# Patient Record
Sex: FEMALE | Race: BLACK OR AFRICAN AMERICAN | NOT HISPANIC OR LATINO | Employment: UNEMPLOYED | URBAN - METROPOLITAN AREA
[De-identification: names, ages, dates, MRNs, and addresses within clinical notes are randomized per-mention and may not be internally consistent; named-entity substitution may affect disease eponyms.]

---

## 2022-05-18 ENCOUNTER — HOSPITAL ENCOUNTER (EMERGENCY)
Facility: HOSPITAL | Age: 22
Discharge: LEFT BEFORE DISCHARGE | End: 2022-05-19
Payer: MEDICAID

## 2022-05-18 VITALS
TEMPERATURE: 99 F | OXYGEN SATURATION: 98 % | HEART RATE: 84 BPM | SYSTOLIC BLOOD PRESSURE: 138 MMHG | DIASTOLIC BLOOD PRESSURE: 78 MMHG | RESPIRATION RATE: 18 BRPM

## 2022-05-18 LAB
MONO NEG CONTROL (OHS): NEGATIVE
MONO POS CONTROL (OHS): POSITIVE
MONO SCR (OHS): NEGATIVE
STREP A PCR (OHS): NOT DETECTED

## 2022-05-18 PROCEDURE — 36415 COLL VENOUS BLD VENIPUNCTURE: CPT | Performed by: NURSE PRACTITIONER

## 2022-05-18 PROCEDURE — 87631 RESP VIRUS 3-5 TARGETS: CPT | Performed by: NURSE PRACTITIONER

## 2022-05-18 PROCEDURE — 86308 HETEROPHILE ANTIBODY SCREEN: CPT | Performed by: NURSE PRACTITIONER

## 2022-05-18 PROCEDURE — 99900041 HC LEFT WITHOUT BEING SEEN- EMERGENCY

## 2022-05-18 NOTE — FIRST PROVIDER EVALUATION
Medical screening exam completed.  I have conducted a focused provider triage encounter, findings are as follows:    Brief history of present illness:  Patient states left ear pain and throat pain x one week.     There were no vitals filed for this visit.    Pertinent physical exam:  Awake, alert, ambulatory      Brief workup plan:  eval    Preliminary workup initiated; this workup will be continued and followed by the physician or advanced practice provider that is assigned to the patient when roomed.